# Patient Record
Sex: MALE | Race: OTHER | URBAN - METROPOLITAN AREA
[De-identification: names, ages, dates, MRNs, and addresses within clinical notes are randomized per-mention and may not be internally consistent; named-entity substitution may affect disease eponyms.]

---

## 2021-05-01 ENCOUNTER — EMERGENCY (EMERGENCY)
Facility: HOSPITAL | Age: 26
LOS: 1 days | Discharge: HOME | End: 2021-05-01
Attending: STUDENT IN AN ORGANIZED HEALTH CARE EDUCATION/TRAINING PROGRAM
Payer: SELF-PAY

## 2021-05-01 VITALS
HEART RATE: 60 BPM | TEMPERATURE: 98 F | OXYGEN SATURATION: 98 % | RESPIRATION RATE: 16 BRPM | DIASTOLIC BLOOD PRESSURE: 72 MMHG | SYSTOLIC BLOOD PRESSURE: 116 MMHG

## 2021-05-01 VITALS
TEMPERATURE: 98 F | RESPIRATION RATE: 16 BRPM | OXYGEN SATURATION: 98 % | DIASTOLIC BLOOD PRESSURE: 81 MMHG | SYSTOLIC BLOOD PRESSURE: 128 MMHG | WEIGHT: 178.57 LBS | HEART RATE: 58 BPM

## 2021-05-01 PROCEDURE — 99053 MED SERV 10PM-8AM 24 HR FAC: CPT

## 2021-05-01 PROCEDURE — 99283 EMERGENCY DEPT VISIT LOW MDM: CPT | Mod: 25

## 2021-05-01 PROCEDURE — 96372 THER/PROPH/DIAG INJ SC/IM: CPT | Mod: XU

## 2021-05-01 PROCEDURE — 10060 I&D ABSCESS SIMPLE/SINGLE: CPT

## 2021-05-01 PROCEDURE — 99284 EMERGENCY DEPT VISIT MOD MDM: CPT

## 2021-05-01 RX ORDER — LIDOCAINE HCL 20 MG/ML
10 VIAL (ML) INJECTION ONCE
Refills: 0 | Status: COMPLETED | OUTPATIENT
Start: 2021-05-01 | End: 2021-05-01

## 2021-05-01 RX ORDER — KETOROLAC TROMETHAMINE 30 MG/ML
30 SYRINGE (ML) INJECTION ONCE
Refills: 0 | Status: DISCONTINUED | OUTPATIENT
Start: 2021-05-01 | End: 2021-05-01

## 2021-05-01 RX ADMIN — Medication 30 MILLIGRAM(S): at 02:44

## 2021-05-01 RX ADMIN — Medication 10 MILLILITER(S): at 04:28

## 2021-05-01 RX ADMIN — Medication 450 MILLIGRAM(S): at 05:45

## 2021-05-01 RX ADMIN — Medication 30 MILLIGRAM(S): at 05:46

## 2021-05-01 NOTE — ED PROVIDER NOTE - PHYSICAL EXAMINATION
Vital Signs Reviewed  GEN: Comfortable, NAD, AAOx3  HEENT: NCAT, MMM, Neck Supple  RESP: CTAB, No rales/rhonchi/wheezing  CV: RRR, S1S2, No murmurs  ABD: No TTP, Soft, ND, No masses, No CVA Tenderness  Extrem/Skin: Lateral aspect of right great toe with erythema and TTP. Equal pulses bilat, No cyanosis/edema/rashes  Neuro: No focal deficits Vital Signs Reviewed  GEN: Comfortable, NAD, AAOx3  HEENT: NCAT, MMM, Neck Supple  RESP: CTAB, No rales/rhonchi/wheezing  CV: RRR, S1S2, No murmurs  ABD: No TTP, Soft, ND, No masses, No CVA Tenderness  Extrem/Skin: Lateral aspect of right great toe with erythema, swelling, and TTP. Equal pulses bilat, No cyanosis/edema/rashes  Neuro: No focal deficits

## 2021-05-01 NOTE — ED PROVIDER NOTE - NSFOLLOWUPINSTRUCTIONS_ED_ALL_ED_FT
You were seen in the emergency room today for a toe infection.    Please call your primary doctor to inform them of this ER visit and obtain the next available appointment within the next 5 days. As we discussed, return to the ER if you have any worsening symptoms.    Please  your prescription and take as directed. Immediately seek medical attention or return to the ER if you have signs or symptoms of an allergic reaction after taking the medication (including- rash or swelling, wheezing or shortness or breath, vomiting or belly pain).    As we discussed please take probiotics to avoid diarrhea from this antibiotic.    We no longer feel that you need further emergency care or admission to the hospital at this time.    While we have determined that you are currently stable for discharge, we know that things can change. Please seek immediate medical attention or return to the ER if you experience any of the following:  Any worsening or persistent symptoms  Severe Pain  Chest Pain  Difficulty Breathing  Bleeding  Passing Out  Severe Rash  Inability to Eat or Drink  Persistent Fever    Please see a primary care doctor or specialist within 5 days to ensure that you are improving.    Please call the [employer] phone numbers on this document if you have any problems obtaining a follow up appointment.    I wish you well! -Dr Garcia

## 2021-05-01 NOTE — ED PROVIDER NOTE - CLINICAL SUMMARY MEDICAL DECISION MAKING FREE TEXT BOX
Patient presenting with signs and symptoms of paronychia. Will give pain control and I&D. Patient stable and will reassess. Patient presenting with signs and symptoms of paronychia. Will give pain control and I&D. Patient stable and will reassess.    I&D of suspected paronychia yielded no pus. Given that no pus evacuated in the ED (though purulent discharge seen at home) and pt has surrounding erythema will treat with 1 week course of clinda, first dose in the ED. Most likely paronychia complicated by surrounding cellulitis - the details of the case, history, and exam make more emergent diagnoses much less likely. Discussed with pt my clinical impression and results, patient given strict return precautions if persistent or worsening of symptoms occurs, and need for close follow up. Pt expressed understanding and agrees with plan. Pt is well appearing with a reassuring exam. Discharge home with PMD or Specialist f/u within 5 days.

## 2021-05-01 NOTE — ED PROVIDER NOTE - OBJECTIVE STATEMENT
26 y/o female with no significant PMHx, presenting with pain, swelling, and redness on lateral aspect of the toenail of the right great toe x 2 weeks. Patient states that it began as an ingrown toenail and he was able to drain pus several days ago and the pain and redness has continued. Patient states other than the pain and redness he is denying any systemic symptoms. Patient denies fever, nausea, vomiting, diarrhea, chest pain, shortness of breath, syncope, recent antibiotics, or any other recent illnesses and hospitalizations. NKDA.

## 2021-05-01 NOTE — ED PROVIDER NOTE - PATIENT PORTAL LINK FT
You can access the FollowMyHealth Patient Portal offered by [facility] by registering at the following website: [email or web address]. By joining [employer]’s FollowMyHealth portal, you will also be able to view your health information using other applications (apps) compatible with our system.

## 2025-07-17 NOTE — ED PROVIDER NOTE - INTERNATIONAL TRAVEL
Rt contacted to evaluate patient per Dr. Sanz. Family at bedside. Will continue to monitor and support patient/family.   No

## 2025-07-23 NOTE — ED ADULT NURSE NOTE - SUICIDE SCREENING QUESTION 3
[facility] EMERGENCY DEPARTMENT      CHIEF COMPLAINT  Alcohol Intoxication (Pt arrives via EMS with alcohol withdrawal, falls, and having delusions. EMS reports pt had 2 falls for them (no LOC -Blood thinners). Pt states he has been going to PT for his gait. Pt drinks a bottle of whiskey every 2-3 days. Last drink @ 1700 yesterday )       HISTORY OF PRESENT ILLNESS  Franklin Mahmood is a 62 y.o. male  who presents to the ED complaining of dizziness and falls.  Printed by EMS report 2 falls, no loss of conscious.  Patient reports to me that he has been going with dizziness and difficulty with his gait for weeks and has been following with PT because of this.  He reported to nursing that he also drinks a bottle of whiskey every 2 to 3 days and had his last drink at 1700 yesterday.  He is mildly confused on my exam.  Denies any pain, nausea, vomiting.    No other complaints, modifying factors or associated symptoms.     I have reviewed the following from the nursing documentation.    Past Medical History:   Diagnosis Date    Acid reflux     Alcohol abuse     Hyperlipidemia     Hypertension     Vertigo      Past Surgical History:   Procedure Laterality Date    COLONOSCOPY  10/06/2015    divert    HERNIA REPAIR      PRE-MALIGNANT / BENIGN SKIN LESION EXCISION      Lump removed from under left arm    WISDOM TOOTH EXTRACTION      2 extracted and still has 2     Family History   Problem Relation Age of Onset    Diabetes Mother     Stroke Father     Heart Disease Father     Cancer Sister     Diabetes Sister      Social History     Socioeconomic History    Marital status:      Spouse name: Not on file    Number of children: Not on file    Years of education: Not on file    Highest education level: Not on file   Occupational History    Not on file   Tobacco Use    Smoking status: Former     Types: Cigars    Smokeless tobacco: Never   Vaping Use    Vaping status: Never Used   Substance and Sexual Activity    Alcohol 
No